# Patient Record
Sex: MALE | Race: WHITE | NOT HISPANIC OR LATINO | Employment: OTHER | URBAN - METROPOLITAN AREA
[De-identification: names, ages, dates, MRNs, and addresses within clinical notes are randomized per-mention and may not be internally consistent; named-entity substitution may affect disease eponyms.]

---

## 2024-03-28 PROBLEM — I10 HYPERTENSION: Status: ACTIVE | Noted: 2024-03-28

## 2024-03-28 PROBLEM — T81.89XA LUMBAR SURGICAL WOUND FLUID COLLECTION: Status: ACTIVE | Noted: 2024-03-28

## 2024-03-28 PROBLEM — Z98.890 HISTORY OF LUMBAR SURGERY: Status: ACTIVE | Noted: 2024-03-28

## 2024-03-28 PROBLEM — I25.10 CAD (CORONARY ARTERY DISEASE): Status: ACTIVE | Noted: 2024-03-28

## 2024-03-28 PROBLEM — E87.6 HYPOKALEMIA: Status: ACTIVE | Noted: 2024-03-28

## 2024-03-31 PROBLEM — F22 PARANOIA: Status: ACTIVE | Noted: 2024-03-31

## 2024-04-03 PROBLEM — Z98.1 S/P LUMBAR FUSION: Status: ACTIVE | Noted: 2024-03-28

## 2024-04-03 PROBLEM — F32.A DEPRESSION: Status: ACTIVE | Noted: 2024-03-31

## 2024-04-04 ENCOUNTER — TELEPHONE (OUTPATIENT)
Dept: NEUROSURGERY | Facility: CLINIC | Age: 79
End: 2024-04-04
Payer: MEDICARE

## 2024-04-04 NOTE — TELEPHONE ENCOUNTER
----- Message from Rosa Parker NP sent at 4/4/2024 11:30 AM CDT -----  Regarding: Follow up wound check  2 week from surgery 3/30/24 for wound check please  Lumbar revision spine

## 2024-04-05 PROBLEM — R45.89 THREATENING TO OTHERS: Status: ACTIVE | Noted: 2024-04-05

## 2024-04-06 PROBLEM — R45.851 SUICIDAL IDEATION: Status: ACTIVE | Noted: 2024-04-06

## 2024-04-12 ENCOUNTER — TELEPHONE (OUTPATIENT)
Dept: NEUROSURGERY | Facility: CLINIC | Age: 79
End: 2024-04-12

## 2024-04-12 NOTE — TELEPHONE ENCOUNTER
Pt current admitted to [facility] spoke to pt nurse about seeing pt to remove staples. Nurse states he will contact his current doctor to see where we go from there

## 2024-05-09 ENCOUNTER — TELEPHONE (OUTPATIENT)
Dept: INFECTIOUS DISEASES | Facility: CLINIC | Age: 79
End: 2024-05-09
Payer: MEDICARE

## 2024-05-09 NOTE — TELEPHONE ENCOUNTER
Spoke with Tiffany at [facility]/He is currently at [facility] in [place] [phone]   On Doxy  mg bid  On Rifampin  mg q day.  does not know if he got IV dapto or Dalvance between [facility] and arriving at [facility]//  Scheduled f/u with Alysia Zapien PA-C on 5/23/24 at 10 am

## 2024-05-09 NOTE — TELEPHONE ENCOUNTER
----- Message from Sari Newman sent at 5/9/2024  9:27 AM CDT -----  Type:  Sooner Appointment Request    Caller is requesting a sooner appointment.  Caller declined first available appointment listed below.  Caller will not accept being placed on the waitlist and is requesting a message be sent to doctor.    Name of Caller:  Tiffany with [facility]  When is the first available appointment?  N/A  Symptoms:  hospital follow up  Would the patient rather a call back or a response via [facility]? Call back  Best Call Back Number:  [phone]  Additional Information:  Tiffany states that the pt needs to be seen within two to three weeks for a hospital follow up. Needs to call the number listed above to schedule for the pt. Please call back and advise. Thanks!

## 2024-05-14 ENCOUNTER — TELEPHONE (OUTPATIENT)
Dept: INFECTIOUS DISEASES | Facility: CLINIC | Age: 79
End: 2024-05-14
Payer: MEDICARE

## 2024-05-14 NOTE — TELEPHONE ENCOUNTER
----- Message from Debra Pruitt sent at 5/14/2024  7:44 AM CDT -----  [facility]  Type: Needs Medical Advice    Who Called:  [facility]   What is this regarding?:  He is on an antibiotic called Risamtim. He is refusing to take it for the past 2 days now.   Best Call Back Number:  [phone]  Additional Information:  Please call back at the phone number listed above to advise. Thank you!

## 2024-05-14 NOTE — TELEPHONE ENCOUNTER
Returned call, spoke with Scott who states pt is refusing Rifampin b/c he previously has diarrhea and when he stopped taking the Rifampin the diarrhea stopped.    Informed Alysia Zapien PA-C. Via this note    Pt has f/u on 5/23/24 with Alysia

## 2024-05-16 ENCOUNTER — OFFICE VISIT (OUTPATIENT)
Dept: NEUROSURGERY | Facility: CLINIC | Age: 79
End: 2024-05-16
Payer: MEDICARE

## 2024-05-16 VITALS
BODY MASS INDEX: 22.7 KG/M2 | DIASTOLIC BLOOD PRESSURE: 55 MMHG | HEIGHT: 69 IN | RESPIRATION RATE: 18 BRPM | SYSTOLIC BLOOD PRESSURE: 122 MMHG | WEIGHT: 153.25 LBS | HEART RATE: 62 BPM

## 2024-05-16 DIAGNOSIS — T81.89XD LUMBAR SURGICAL WOUND FLUID COLLECTION, SUBSEQUENT ENCOUNTER: Primary | ICD-10-CM

## 2024-05-16 DIAGNOSIS — Z98.890 STATUS POST LUMBAR SPINE OPERATION: ICD-10-CM

## 2024-05-16 PROCEDURE — 99213 OFFICE O/P EST LOW 20 MIN: CPT | Mod: PBBFAC,PN | Performed by: NURSE PRACTITIONER

## 2024-05-16 PROCEDURE — 99999 PR PBB SHADOW E&M-EST. PATIENT-LVL III: CPT | Mod: PBBFAC,,, | Performed by: NURSE PRACTITIONER

## 2024-05-16 PROCEDURE — 99024 POSTOP FOLLOW-UP VISIT: CPT | Mod: POP,,, | Performed by: NURSE PRACTITIONER

## 2024-05-16 RX ORDER — GABAPENTIN 100 MG/1
100 CAPSULE ORAL 3 TIMES DAILY
COMMUNITY
Start: 2024-04-19 | End: 2025-04-19

## 2024-05-16 NOTE — PROGRESS NOTES
Neurosurgery History & Physical    Patient ID: Sammy Tello is a 79 y.o. male.    Chief Complaint   Patient presents with    Follow-up     Hospital f/u     Interval history 5/16/24:   Pt presents for postop appt. He is 6 weeks s/p wound exploration, evacuation of fluid collection with Dr. Brooks on 3/30/24. Intra-op cx + Staph Epi. Pt being treated with PO abx (one abx d/c d/t side effects). Has f/u with ID next week.     Plans to return to [place] in the near future.     Reports back pain at the end of the day. 3-4/10 midline pain. No radiating symptoms. Denies BB dysfunction.     History of Present Illness:   79-year-old male history of essential hypertension and nonobstructive CAD presented to the ED with complaints of intractable lower back pain. It seems that on 03/19, patient had a lumbar surgery/spinal fusion in [place]. The patient [place] back [place] the [place] and has been living with his son and and a hotel. He states that he feels as though he has been overdoing it, but due to the pain he can no longer care for himself. At home, he has been taking prescribed Lyrica and tramadol with moderate improvement in his pain. Patient denies fever, chills, chest pain, shortness for breath, urinary symptoms, and abdominal pain.     Review of Systems    Past Medical History:   Diagnosis Date    History of heart attack     35 years ago     Social History     Socioeconomic History    Marital status:      Social Determinants of Health     Financial Resource Strain: Patient Declined (4/19/2024)    Received from [facility]    Overall Financial Resource Strain (CARDIA)     Difficulty of Paying Living Expenses: Patient declined   Food Insecurity: No Food Insecurity (4/19/2024)    Received from [facility]    Hunger Vital Sign     Worried About Running Out of Food in the Last Year: Never true     Ran Out of Food in the Last Year: Never true   Transportation Needs: No Transportation Needs (4/19/2024)    Received from [facility]    PRAPARE - Transportation     Lack of Transportation (Medical): No     Lack of Transportation (Non-Medical): No   Physical Activity: Inactive (4/19/2024)    Received from [facility]    Exercise Vital Sign     Days of Exercise per Week: 0 days     Minutes of Exercise per Session: 0 min   Stress: Patient Declined (4/19/2024)    Received from [facility]    [place] [employer] - Occupational Stress Questionnaire     Feeling of Stress : Patient declined   Housing Stability: Low Risk  (3/31/2024)    Housing Stability Vital Sign     Unable to Pay for Housing in the Last Year: No     Number of Places Lived in the Last Year: 2     Unstable Housing in the Last Year: No     No family history on file.  Review of patient's allergies indicates:  No Known Allergies    Current Outpatient Medications:     acetaminophen (TYLENOL) 325 MG tablet, Take 2 tablets (650 mg total) by mouth every 4 (four) hours as needed., Disp: , Rfl: 0    clonazePAM (KLONOPIN) 0.125 MG disintegrating tablet, Take 1 tablet (0.125 mg total) by mouth nightly., Disp: 30 tablet, Rfl: 0    divalproex (DEPAKOTE SPRINKLE) 125 mg capsule, Take 1 capsule (125 mg total) by mouth every 12 (twelve) hours., Disp: 60 capsule, Rfl: 0    doxycycline (VIBRA-TABS) 100 MG tablet, Take 1 tablet (100 mg total) by mouth every 12 (twelve) hours., Disp: 180 tablet, Rfl: 0    gabapentin (NEURONTIN) 100 MG capsule, Take 100 mg by mouth 3 (three) times daily., Disp: , Rfl:     ketorolac tromethamine (TORADOL ORAL), Take 1 Dose by mouth every 8 (eight) hours. 1 dose = 6 drops Tradol 100mg/1 ml  (prescription from [place]) pt mixes in water, Disp: , Rfl:     rifAMpin (RIFADIN) 300 MG capsule, Take 1 capsule (300 mg total) by mouth once daily., Disp: 87 capsule, Rfl: 0    buPROPion (WELLBUTRIN) 75 MG tablet, Take 2 tablets (150 mg total) by mouth once daily., Disp: 60 tablet, Rfl: 0    losartan (COZAAR) 25 MG tablet, Take 1 tablet (25 mg total) by mouth  "once daily. (Patient not taking: Reported on 5/16/2024), Disp: 90 tablet, Rfl: 0    pregabalin (LYRICA) 50 MG capsule, Take 1 capsule (50 mg total) by mouth every evening. (Patient not taking: Reported on 5/16/2024), Disp: 30 capsule, Rfl: 0  Resp. rate 18, height 5' 9" (1.753 m), weight 69.5 kg (153 lb 3.5 oz).      Neurologic Exam     Mental Status   Oriented to person, place, and time.   Level of consciousness: alert    Cranial Nerves     CN III, IV, VI   Extraocular motions are normal.     CN VII   Facial expression full, symmetric.     Motor Exam   Muscle bulk: normal  Overall muscle tone: normal    Strength   Strength 5/5 throughout.     Sensory Exam   Light touch normal.     Gait, Coordination, and Reflexes     Gait  Gait: normal        Imaging:   No new imaging for review.     Assessment & Plan:   79 year old male 6 weeks s/p wound washout following lumbar fusion in [place]. He should follow up with ID as scheduled. D/w Dr. Brooks who recommended XRs now and phone f/u at 12 weeks to check on progress. No further imaging with MRI needed at this time. He is agreeable to the plan.      "

## 2024-05-22 NOTE — PROGRESS NOTES
[facility]  Infectious Disease        Patient ID: Sammy Tello is a 79 y.o. male.    Chief Complaint: Follow-up      Sammy was seen today for follow-up.    Diagnoses and all orders for this visit:    Lumbar surgical wound fluid collection, subsequent encounter    Status post lumbar spine operation    Long term (current) use of antibiotics         Greater than 30 minutes was spent on this encounter, which included: review of recent encounters, review and interpretation of labs/images, obtaining pertinent history, performing a physical examination, counseling and educating the patient/family/caregiver, ordering medications/tests, documenting in the electronic health record, and coordinating care with necessary providers.    Interval HPI:   5/23/24 - ID clinic f/u. Since hospitalization, patient was discharged to [facility]. There he apparently stopped taking rifampin due to GI side effects. He has continued taking doxycycline PO. Unclear if he ever received IV Dalbavancin but there is no documented infusions scheduled in his chart and he does not remember this. He states he has been doing very well and improving with therapy. Does not have back pain. Wound is closed without drainage. He reports he is about to be discharged from his facility and plans to head back to [place] on the next flight out in the coming days and resume all of his healthcare there.    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  10 cc removed  - cultures positive for Staph epidermidis multiple cultures  - received IV Daptomycin  3/30 - 4/9/24  - PO rifampin added 4/5/24  - discharged on PO doxycycline + PO rifampin, plus recommended 2 doses of IV Dalbavancin (but seems patient never received)  - doxycycline for 6 weeks from surgery (5/11/24), and then continue indefinitely as suppression   - rifampin planned for 3 months (7/4/24), but patient discontinued due to side effects     # Major depressive disorder  - associated with homicidal and suicidal ideation requiring PEC then CEC and eventually discharged to inpatient Psych unit     Recommendations:  - patient received 1.5 weeks of IV Dapto followed by PO doxy/rifampin  - it appears he appear that he never received Dalbavancin infusions as recommended  - he has also d/c rifampin  - despite this, he thankfully seems to be doing very well and is without complaints  - continue doxycycline 100 mg PO BID indefinitely as suppression, considering hardware   - he states he has several months of medication from when he discharged from [facility] (per MAR, has pills through July). He denies new refills today  - provided him with necessary information to continue his care upon return to [place]  - he may f/u with our office as needed       Alysia Zapien PA-C  Infectious Diseases  [facility]        HPI:       Mr. eTllo is a 79-year-old male history of essential hypertension and nonobstructive CAD presented to the ED with complaints of lower back pain.  He has a recent history of lumbar fusion on 3/19 in [place]. He was ambulating and getting out of bed several times a day however was unable to get out of bed anymore prompting visit to the hospital. He states that his pain was in a band distribution of his lower abdomen. He denies having fevers, chills or loss of appetite during this time. On admission, MRi showed a large fluic collection in the lumbar laminectomy bad causing severe compression  of the thecal sca. IR was consulted to aspirate fluid which was done on 3/28. 75  cc of turbid serosanguinous fluid was removed. Cultures were obtained and are growing CONS. Patient states that he feels about the same after aspiration of the fluid. He has had alternating constipation and now diarrhea and is able to get out of bed himself to go to the restroom. He is urinating fine. He had a normal WBC but elevated ESR and CRP.   ID was consulted for further management      Past Medical History:   Diagnosis Date    History of heart attack     35 years ago       Past Surgical History:   Procedure Laterality Date    INSERTION OF CATHETER N/A 3/30/2024    Procedure: INSERTION, LUMBAR CATHETER;  Surgeon: Leo Brooks MD;  Location: [facility] OR;  Service: Neurosurgery;  Laterality: N/A;    REPAIR OF CEREBROSPINAL FLUID LEAK OF SPINE N/A 3/30/2024    Procedure: REPAIR, CSF LEAK, SPINE;  Surgeon: Leo Brooks MD;  Location: [facility] OR;  Service: Neurosurgery;  Laterality: N/A;    WOUND EXPLORATION N/A 3/30/2024    Procedure: EXPLORATION, WOUND- evacuation of lumbar fluid collection;  Surgeon: Leo Brooks MD;  Location: [facility] OR;  Service: Neurosurgery;  Laterality: N/A;  CLEAN CLASSIFICATION PER MD       No family history on file.    Social History     Socioeconomic History    Marital status:      Social Determinants of Health     Financial Resource Strain: Patient Declined (4/19/2024)    Received from [facility]    Overall Financial Resource Strain (CARDIA)     Difficulty of Paying Living Expenses: Patient declined   Food Insecurity: No Food Insecurity (4/19/2024)    Received from [facility]    Hunger Vital Sign     Worried About Running Out of Food in the Last Year: Never true     Ran Out of Food in the Last Year: Never true   Transportation Needs: No Transportation Needs (4/19/2024)    Received from [facility]    PRAPARE - Transportation     Lack of Transportation (Medical): No     Lack of Transportation (Non-Medical): No   Physical Activity: Inactive (4/19/2024)    Received  from [facility]    Exercise Vital Sign     Days of Exercise per Week: 0 days     Minutes of Exercise per Session: 0 min   Stress: Patient Declined (4/19/2024)    Received from [facility]    [employer] [employer] [place] [employer] [place] [employer] - Occupational Stress Questionnaire     Feeling of Stress : Patient declined   Housing Stability: Low Risk  (3/31/2024)    Housing Stability Vital Sign     Unable to Pay for Housing in the Last Year: No     Number of Places Lived in the Last Year: 2     Unstable Housing in the Last Year: No       Review of patient's allergies indicates:  No Known Allergies    Current Outpatient Medications   Medication Instructions    acetaminophen (TYLENOL) 650 mg, Oral, Every 4 hours PRN    buPROPion (WELLBUTRIN) 150 mg, Oral, Daily    clonazePAM (KLONOPIN) 0.125 mg, Oral, Nightly    divalproex (DEPAKOTE SPRINKLE) 125 mg, Oral, Every 12 hours    doxycycline (VIBRA-TABS) 100 mg, Oral, Every 12 hours    gabapentin (NEURONTIN) 100 mg, Oral, 3 times daily    ketorolac tromethamine (TORADOL ORAL) 1 Dose, Oral, Every 8 hours, 1 dose = 6 drops Tradol 100mg/1 ml  (prescription from [place]) pt mixes in water    losartan (COZAAR) 25 mg, Oral, Daily    pregabalin (LYRICA) 50 mg, Oral, Nightly    rifAMpin (RIFADIN) 300 mg, Oral, Daily         Review of Systems   Constitutional:  Negative for activity change, appetite change, chills, fatigue and fever.   HENT:  Negative for congestion, mouth sores, sinus pain and sore throat.    Eyes:  Negative for photophobia and visual disturbance.   Respiratory:  Negative for cough, chest tightness, shortness of breath and wheezing.    Cardiovascular:  Negative for chest pain, palpitations and leg swelling.   Gastrointestinal:  Negative for abdominal pain, diarrhea, nausea and vomiting.   Genitourinary:  Negative for dysuria, flank pain, hematuria and urgency.   Musculoskeletal:  Negative for arthralgias, myalgias, neck pain and neck stiffness.   Skin:  Negative for color change  and rash.   Neurological:  Negative for dizziness, seizures and headaches.   Psychiatric/Behavioral:  Negative for confusion.            Objective:     There were no vitals filed for this visit.           Physical Exam  Vitals and nursing note reviewed.   Constitutional:       General: He is awake. He is not in acute distress.     Appearance: He is well-developed and well-groomed. He is not diaphoretic.   HENT:      Head: Normocephalic and atraumatic.      Right Ear: External ear normal.      Left Ear: External ear normal.      Nose: Nose normal.   Eyes:      General: No scleral icterus.        Right eye: No discharge.         Left eye: No discharge.      Pupils: Pupils are equal, round, and reactive to light.   Cardiovascular:      Rate and Rhythm: Normal rate and regular rhythm.   Pulmonary:      Effort: Pulmonary effort is normal. No accessory muscle usage or respiratory distress.   Abdominal:      General: There is no distension.   Musculoskeletal:      Cervical back: Normal range of motion and neck supple.      Comments: Wearing TLSO brace   Lymphadenopathy:      Cervical: No cervical adenopathy.   Skin:     General: Skin is warm and dry.   Neurological:      General: No focal deficit present.      Mental Status: He is alert and oriented to person, place, and time.   Psychiatric:         Attention and Perception: Attention normal.         Mood and Affect: Mood normal.         Speech: Speech normal.         Behavior: Behavior normal.         Thought Content: Thought content normal.         Cognition and Memory: Cognition normal.           CrCl cannot be calculated (Patient's most recent lab result is older than the maximum 7 days allowed.).      Microbiology Results (last 7 days)       ** No results found for the last 168 hours. **              Significant Labs: All pertinent labs within the past 24 hours have been reviewed.     Significant Imaging: I have reviewed all relevant and available imaging  results/findings within the past 24 hours.      Plan -- see top of note

## 2024-05-23 ENCOUNTER — OFFICE VISIT (OUTPATIENT)
Dept: INFECTIOUS DISEASES | Facility: CLINIC | Age: 79
End: 2024-05-23
Payer: MEDICARE

## 2024-05-23 DIAGNOSIS — Z79.2 LONG TERM (CURRENT) USE OF ANTIBIOTICS: ICD-10-CM

## 2024-05-23 DIAGNOSIS — Z98.890 STATUS POST LUMBAR SPINE OPERATION: ICD-10-CM

## 2024-05-23 DIAGNOSIS — T81.89XD LUMBAR SURGICAL WOUND FLUID COLLECTION, SUBSEQUENT ENCOUNTER: Primary | ICD-10-CM

## 2024-05-23 PROCEDURE — 99213 OFFICE O/P EST LOW 20 MIN: CPT | Mod: S$PBB,,, | Performed by: PHYSICIAN ASSISTANT

## 2024-05-23 PROCEDURE — 99999 PR PBB SHADOW E&M-EST. PATIENT-LVL III: CPT | Mod: PBBFAC,,, | Performed by: PHYSICIAN ASSISTANT

## 2024-05-23 PROCEDURE — 99213 OFFICE O/P EST LOW 20 MIN: CPT | Mod: PBBFAC,PO | Performed by: PHYSICIAN ASSISTANT

## 2024-05-23 RX ORDER — FLUOXETINE HYDROCHLORIDE 20 MG/1
20 CAPSULE ORAL
COMMUNITY
Start: 2024-04-19 | End: 2025-04-19

## 2024-05-23 RX ORDER — TRAZODONE HYDROCHLORIDE 100 MG/1
100 TABLET ORAL NIGHTLY
COMMUNITY

## 2024-05-23 RX ORDER — MULTIVITAMIN
1 TABLET ORAL DAILY
COMMUNITY
Start: 2024-04-19 | End: 2025-04-19

## 2024-05-23 NOTE — PATIENT INSTRUCTIONS
S/p lumbar laminectomy and fusion 3/19/24, complicated by CSF leak with large fluid collection compressing thecal sec  - s/p washout 3/30/24:  Exploration, incision and drainage by Neurosurgery.  10 cc removed  - Cultures: Methicillin sensitive Staph epidermidis (R- fluoroquinolones, bactrim, clindamycin)  - received doxycycline 100mg po BID for 6 weeks from surgery (5/11/24), and then will continue indefinitely as suppression, considering hardware  - could not tolerate rifampin due to GI side effects  - recommend continuing doxycyline as above for at least 1-2 years, or lifelong if not having side effects  - should have routine labwork by PCP when he gets to [place]

## 2024-06-25 ENCOUNTER — TELEPHONE (OUTPATIENT)
Dept: NEUROSURGERY | Facility: CLINIC | Age: 79
End: 2024-06-25
Payer: MEDICARE

## 2024-06-25 NOTE — TELEPHONE ENCOUNTER
I called Mr. Tello to follow-up with him as he is 12 weeks s/p wound exploration of his lumbar spine.  Mr. Tello's son, Helio, answered his phone.  Mr. Tello's son reports that he has gone back to [place].  Mr. Tello's son reports that he has been playing phone tag with his father over the past week.  He states that when he last spoke with his father a week ago that he was doing well and getting around well.  Mr. Tello's son reports that he will notify our office if there are any changes when he speaks with him.